# Patient Record
Sex: FEMALE | Race: WHITE | NOT HISPANIC OR LATINO | URBAN - METROPOLITAN AREA
[De-identification: names, ages, dates, MRNs, and addresses within clinical notes are randomized per-mention and may not be internally consistent; named-entity substitution may affect disease eponyms.]

---

## 2022-03-10 ENCOUNTER — PREPPED CHART (OUTPATIENT)
Dept: URBAN - METROPOLITAN AREA CLINIC 94 | Facility: CLINIC | Age: 77
End: 2022-03-10

## 2022-03-10 PROBLEM — H40.013 GLAUCOMA SUSPECT, LOW RISK: Noted: 2022-03-10

## 2022-03-10 PROBLEM — H35.363 MACULAR DRUSEN: Noted: 2022-01-12

## 2022-03-10 PROBLEM — H25.11 NUCLEAR SCLEROSIS: Noted: 2021-02-23

## 2022-03-10 PROBLEM — H25.13 NUCLEAR SCLEROSIS: Noted: 2021-02-23

## 2022-06-21 ENCOUNTER — FOLLOW UP (OUTPATIENT)
Dept: URBAN - METROPOLITAN AREA CLINIC 94 | Facility: CLINIC | Age: 77
End: 2022-06-21

## 2022-06-21 DIAGNOSIS — H40.013: ICD-10-CM

## 2022-06-21 PROCEDURE — 92012 INTRM OPH EXAM EST PATIENT: CPT

## 2022-06-21 ASSESSMENT — VISUAL ACUITY
OD_CC: 20/40-2
OS_CC: 20/70-1

## 2022-06-21 ASSESSMENT — TONOMETRY
OD_IOP_MMHG: 21
OS_IOP_MMHG: 19

## 2022-07-19 ENCOUNTER — COMPLETE EYE EXAM (OUTPATIENT)
Dept: URBAN - METROPOLITAN AREA CLINIC 94 | Facility: CLINIC | Age: 77
End: 2022-07-19

## 2022-07-19 DIAGNOSIS — H35.3132: ICD-10-CM

## 2022-07-19 PROCEDURE — 92202 OPSCPY EXTND ON/MAC DRAW: CPT

## 2022-07-19 PROCEDURE — 92014 COMPRE OPH EXAM EST PT 1/>: CPT

## 2022-07-19 PROCEDURE — 92134 CPTRZ OPH DX IMG PST SGM RTA: CPT

## 2022-07-19 ASSESSMENT — VISUAL ACUITY
OS_CC: 20/70-1
OD_CC: 20/40+2

## 2022-07-19 ASSESSMENT — TONOMETRY
OD_IOP_MMHG: 15
OS_IOP_MMHG: 13

## 2022-07-20 ENCOUNTER — FOLLOW UP (OUTPATIENT)
Dept: URBAN - METROPOLITAN AREA CLINIC 94 | Facility: CLINIC | Age: 77
End: 2022-07-20

## 2022-07-20 DIAGNOSIS — H40.013: ICD-10-CM

## 2022-07-20 PROCEDURE — 92012 INTRM OPH EXAM EST PATIENT: CPT

## 2022-07-20 ASSESSMENT — TONOMETRY
OD_IOP_MMHG: 11
OS_IOP_MMHG: 11

## 2022-07-20 ASSESSMENT — VISUAL ACUITY
OS_CC: 20/50-2
OD_CC: 20/40-2

## 2022-08-13 ENCOUNTER — HOSPITAL ENCOUNTER (OUTPATIENT)
Facility: CLINIC | Age: 77
Discharge: HOME | End: 2022-08-13
Attending: FAMILY MEDICINE
Payer: COMMERCIAL

## 2022-08-13 VITALS
HEART RATE: 73 BPM | RESPIRATION RATE: 16 BRPM | SYSTOLIC BLOOD PRESSURE: 153 MMHG | DIASTOLIC BLOOD PRESSURE: 81 MMHG | OXYGEN SATURATION: 99 % | TEMPERATURE: 98.2 F

## 2022-08-13 DIAGNOSIS — N39.0 URINARY TRACT INFECTION WITHOUT HEMATURIA, SITE UNSPECIFIED: Primary | ICD-10-CM

## 2022-08-13 LAB
BACTERIA, POC: ABNORMAL
BILIRUBIN, POC: NEGATIVE
BLOOD URINE, POC: NEGATIVE
CLARITY, POC: CLEAR
COLOR, POC: YELLOW
EXPIRATION DATE: ABNORMAL
GLUCOSE URINE, POC: NEGATIVE
KETONES, POC: NEGATIVE
LEUKOCYTE EST, POC: NEGATIVE
Lab: ABNORMAL
NITRITE, POC: ABNORMAL
PH, POC: 7
POCT MANUFACTURER: ABNORMAL
PROTEIN, POC: ABNORMAL
SPECIFIC GRAVITY, POC: 1.01
UROBILINOGEN, POC: 2

## 2022-08-13 PROCEDURE — 81002 URINALYSIS NONAUTO W/O SCOPE: CPT | Performed by: NURSE PRACTITIONER

## 2022-08-13 PROCEDURE — 99213 OFFICE O/P EST LOW 20 MIN: CPT | Performed by: NURSE PRACTITIONER

## 2022-08-13 PROCEDURE — 87086 URINE CULTURE/COLONY COUNT: CPT | Performed by: NURSE PRACTITIONER

## 2022-08-13 RX ORDER — NITROFURANTOIN 25; 75 MG/1; MG/1
100 CAPSULE ORAL 2 TIMES DAILY
Qty: 10 CAPSULE | Refills: 0 | Status: SHIPPED | OUTPATIENT
Start: 2022-08-13 | End: 2022-08-18

## 2022-08-13 ASSESSMENT — ENCOUNTER SYMPTOMS
ABDOMINAL PAIN: 0
FEVER: 0
ARTHRALGIAS: 0
EYE PAIN: 0
SHORTNESS OF BREATH: 0
PALPITATIONS: 0
CHILLS: 0
COLOR CHANGE: 0
VOMITING: 0
COUGH: 0
SORE THROAT: 0
DYSURIA: 1
SEIZURES: 0
HEMATURIA: 0
BACK PAIN: 0

## 2022-08-13 NOTE — ED ATTESTATION NOTE
I did not see the patient but was available for consultation     Ajith Watkins MD  08/13/22 [id number]

## 2022-08-13 NOTE — ED PROVIDER NOTES
Emergency Medicine Note  HPI   HISTORY OF PRESENT ILLNESS     Patient presents with lower abdominal pressure, dysuria and fatigue x1 week.  She reports a history of chronic UTIs.  She takes clear tract daily.  She reports that a home UTI test revealed positive leukocytes.  Denies any fever, hematuria and no new back pain.            Patient History   PAST HISTORY     Reviewed from Nursing Triage:         No past medical history on file.    No past surgical history on file.    No family history on file.           Review of Systems   REVIEW OF SYSTEMS     Review of Systems   Constitutional: Negative for chills and fever.   HENT: Negative for ear pain and sore throat.    Eyes: Negative for pain and visual disturbance.   Respiratory: Negative for cough and shortness of breath.    Cardiovascular: Negative for chest pain and palpitations.   Gastrointestinal: Negative for abdominal pain and vomiting.   Genitourinary: Positive for dysuria. Negative for hematuria.   Musculoskeletal: Negative for arthralgias and back pain.   Skin: Negative for color change and rash.   Neurological: Negative for seizures and syncope.   All other systems reviewed and are negative.        VITALS     ED Vitals    None                      Physical Exam   PHYSICAL EXAM     Physical Exam  Vitals reviewed.   Constitutional:       General: She is not in acute distress.     Appearance: Normal appearance. She is not ill-appearing, toxic-appearing or diaphoretic.   HENT:      Head: Normocephalic and atraumatic.   Cardiovascular:      Rate and Rhythm: Normal rate.   Pulmonary:      Effort: Pulmonary effort is normal. No respiratory distress.   Abdominal:      Tenderness: There is no right CVA tenderness or left CVA tenderness.   Musculoskeletal:         General: Normal range of motion.      Cervical back: Normal range of motion. No rigidity.   Skin:     General: Skin is warm and dry.   Neurological:      General: No focal deficit present.      Mental  Status: She is alert and oriented to person, place, and time.   Psychiatric:         Mood and Affect: Mood normal.         Behavior: Behavior normal.           PROCEDURES     Procedures     DATA     Results     None              No orders to display       Scoring tools                                  ED Course & MDM   MDM / ED COURSE / CLINICAL IMPRESSION / DISPO     MDM  Number of Diagnoses or Management Options  Urinary tract infection without hematuria, site unspecified  Diagnosis management comments: Urine culture with positive nitirites.  Sent prescription for Macrobid to requested pharmacy.   Sent urine culture, will call with results.  Recommended increase fluids.   Instructed to follow up with PCP or ED for new or worsening symptoms.            Clinical Impression      Urinary tract infection without hematuria, site unspecified     Disposition           Alma Marcos CRNP  08/13/22 1602       Alma Marcos CRNP  08/13/22 1620

## 2022-08-16 ENCOUNTER — TELEPHONE (OUTPATIENT)
Dept: URGENT CARE | Facility: CLINIC | Age: 77
End: 2022-08-16
Payer: COMMERCIAL

## 2022-08-16 LAB
BACTERIA UR CULT: ABNORMAL
BACTERIA UR CULT: ABNORMAL

## 2022-11-01 ENCOUNTER — FOLLOW UP (OUTPATIENT)
Dept: URBAN - METROPOLITAN AREA CLINIC 94 | Facility: CLINIC | Age: 77
End: 2022-11-01

## 2022-11-01 DIAGNOSIS — H40.013: ICD-10-CM

## 2022-11-01 PROCEDURE — 92012 INTRM OPH EXAM EST PATIENT: CPT

## 2022-11-01 ASSESSMENT — VISUAL ACUITY
OD_CC: 20/40
OS_CC: 20/60-2

## 2022-11-01 ASSESSMENT — TONOMETRY
OS_IOP_MMHG: 15
OD_IOP_MMHG: 14

## 2023-01-20 ENCOUNTER — COMPLETE EYE EXAM (OUTPATIENT)
Dept: URBAN - METROPOLITAN AREA CLINIC 94 | Facility: CLINIC | Age: 78
End: 2023-01-20

## 2023-01-20 DIAGNOSIS — H35.3132: ICD-10-CM

## 2023-01-20 DIAGNOSIS — H40.013: ICD-10-CM

## 2023-01-20 PROCEDURE — 92134 CPTRZ OPH DX IMG PST SGM RTA: CPT

## 2023-01-20 PROCEDURE — 92014 COMPRE OPH EXAM EST PT 1/>: CPT

## 2023-01-20 PROCEDURE — 92202 OPSCPY EXTND ON/MAC DRAW: CPT

## 2023-01-20 ASSESSMENT — VISUAL ACUITY
OS_CC: 20/70-1
OD_CC: 20/50-1

## 2023-01-20 ASSESSMENT — TONOMETRY
OS_IOP_MMHG: 14
OD_IOP_MMHG: 14

## 2023-03-21 ENCOUNTER — COMPLETE EYE EXAM (OUTPATIENT)
Dept: URBAN - METROPOLITAN AREA CLINIC 94 | Facility: CLINIC | Age: 78
End: 2023-03-21

## 2023-03-21 DIAGNOSIS — H40.013: ICD-10-CM

## 2023-03-21 DIAGNOSIS — H35.3132: ICD-10-CM

## 2023-03-21 PROCEDURE — 92014 COMPRE OPH EXAM EST PT 1/>: CPT

## 2023-03-21 ASSESSMENT — TONOMETRY
OD_IOP_MMHG: 14
OS_IOP_MMHG: 14

## 2023-03-21 ASSESSMENT — VISUAL ACUITY
OS_CC: 20/50
OD_CC: 20/40

## 2023-07-18 ENCOUNTER — FOLLOW UP (OUTPATIENT)
Dept: URBAN - METROPOLITAN AREA CLINIC 94 | Facility: CLINIC | Age: 78
End: 2023-07-18

## 2023-07-18 DIAGNOSIS — H40.013: ICD-10-CM

## 2023-07-18 DIAGNOSIS — H35.3132: ICD-10-CM

## 2023-07-18 PROCEDURE — 92012 INTRM OPH EXAM EST PATIENT: CPT

## 2023-07-18 PROCEDURE — 92083 EXTENDED VISUAL FIELD XM: CPT

## 2023-07-18 PROCEDURE — 92133 CPTRZD OPH DX IMG PST SGM ON: CPT

## 2023-07-18 ASSESSMENT — VISUAL ACUITY
OS_CC: 20/60
OD_CC: 20/50

## 2023-07-18 ASSESSMENT — TONOMETRY
OS_IOP_MMHG: 14
OD_IOP_MMHG: 13

## 2023-07-21 ENCOUNTER — ESTABLISHED COMPREHENSIVE EXAM (OUTPATIENT)
Dept: URBAN - METROPOLITAN AREA CLINIC 94 | Facility: CLINIC | Age: 78
End: 2023-07-21

## 2023-07-21 DIAGNOSIS — H40.013: ICD-10-CM

## 2023-07-21 DIAGNOSIS — H35.3132: ICD-10-CM

## 2023-07-21 DIAGNOSIS — H25.13: ICD-10-CM

## 2023-07-21 PROCEDURE — 92134 CPTRZ OPH DX IMG PST SGM RTA: CPT

## 2023-07-21 PROCEDURE — 92014 COMPRE OPH EXAM EST PT 1/>: CPT

## 2023-07-21 PROCEDURE — 92202 OPSCPY EXTND ON/MAC DRAW: CPT

## 2023-07-21 ASSESSMENT — VISUAL ACUITY
OD_CC: 20/50+2
OS_CC: 20/50+2

## 2023-07-21 ASSESSMENT — TONOMETRY
OD_IOP_MMHG: 14
OS_IOP_MMHG: 15

## 2023-08-29 ENCOUNTER — DIAGNOSTICS ONLY (OUTPATIENT)
Dept: URBAN - METROPOLITAN AREA CLINIC 94 | Facility: CLINIC | Age: 78
End: 2023-08-29

## 2023-08-29 DIAGNOSIS — H35.3132: ICD-10-CM

## 2023-08-29 PROCEDURE — 92235 FLUORESCEIN ANGRPH MLTIFRAME: CPT

## 2023-08-29 ASSESSMENT — VISUAL ACUITY
OS_CC: 20/50
OD_CC: 20/40

## 2023-11-14 ENCOUNTER — IOP CHECK (OUTPATIENT)
Dept: URBAN - METROPOLITAN AREA CLINIC 94 | Facility: CLINIC | Age: 78
End: 2023-11-14

## 2023-11-14 DIAGNOSIS — H35.3132: ICD-10-CM

## 2023-11-14 DIAGNOSIS — H40.013: ICD-10-CM

## 2023-11-14 PROCEDURE — 92012 INTRM OPH EXAM EST PATIENT: CPT

## 2023-11-14 ASSESSMENT — TONOMETRY
OD_IOP_MMHG: 16
OS_IOP_MMHG: 17

## 2023-11-14 ASSESSMENT — VISUAL ACUITY
OS_CC: 20/50
OD_CC: 20/40-1

## 2024-03-14 ENCOUNTER — ESTABLISHED COMPREHENSIVE EXAM (OUTPATIENT)
Dept: URBAN - METROPOLITAN AREA CLINIC 94 | Facility: CLINIC | Age: 79
End: 2024-03-14

## 2024-03-14 DIAGNOSIS — H40.013: ICD-10-CM

## 2024-03-14 DIAGNOSIS — H25.13: ICD-10-CM

## 2024-03-14 DIAGNOSIS — H35.3132: ICD-10-CM

## 2024-03-14 PROCEDURE — 92014 COMPRE OPH EXAM EST PT 1/>: CPT

## 2024-03-14 ASSESSMENT — VISUAL ACUITY
OS_CC: 20/60-1
OD_CC: 20/50-1

## 2024-03-14 ASSESSMENT — TONOMETRY
OS_IOP_MMHG: 16
OD_IOP_MMHG: 16

## 2024-04-05 ENCOUNTER — ESTABLISHED COMPREHENSIVE EXAM (OUTPATIENT)
Dept: URBAN - METROPOLITAN AREA CLINIC 82 | Facility: CLINIC | Age: 79
End: 2024-04-05

## 2024-04-05 DIAGNOSIS — H35.3132: ICD-10-CM

## 2024-04-05 DIAGNOSIS — H25.13: ICD-10-CM

## 2024-04-05 DIAGNOSIS — H40.013: ICD-10-CM

## 2024-04-05 PROCEDURE — 92014 COMPRE OPH EXAM EST PT 1/>: CPT

## 2024-04-05 PROCEDURE — 92202 OPSCPY EXTND ON/MAC DRAW: CPT

## 2024-04-05 PROCEDURE — 92134 CPTRZ OPH DX IMG PST SGM RTA: CPT

## 2024-04-05 ASSESSMENT — TONOMETRY
OS_IOP_MMHG: 14
OD_IOP_MMHG: 14

## 2024-04-05 ASSESSMENT — VISUAL ACUITY
OS_CC: 20/80-2
OD_CC: 20/60-2

## 2024-07-25 ENCOUNTER — DIAGNOSTICS ONLY (OUTPATIENT)
Dept: URBAN - METROPOLITAN AREA CLINIC 90 | Facility: CLINIC | Age: 79
End: 2024-07-25

## 2024-07-25 DIAGNOSIS — H40.013: ICD-10-CM

## 2024-07-25 PROCEDURE — 92133 CPTRZD OPH DX IMG PST SGM ON: CPT

## 2024-07-25 PROCEDURE — 92083 EXTENDED VISUAL FIELD XM: CPT

## 2024-08-16 ENCOUNTER — FOLLOW UP (OUTPATIENT)
Dept: URBAN - METROPOLITAN AREA CLINIC 82 | Facility: CLINIC | Age: 79
End: 2024-08-16

## 2024-08-16 DIAGNOSIS — H40.013: ICD-10-CM

## 2024-08-16 DIAGNOSIS — H35.3132: ICD-10-CM

## 2024-08-16 DIAGNOSIS — H25.13: ICD-10-CM

## 2024-08-16 PROCEDURE — 99214 OFFICE O/P EST MOD 30 MIN: CPT

## 2024-08-16 ASSESSMENT — TONOMETRY
OD_IOP_MMHG: 14
OS_IOP_MMHG: 14

## 2024-08-16 ASSESSMENT — VISUAL ACUITY
OD_CC: 20/50
OS_CC: 20/100

## 2024-09-16 ENCOUNTER — CATARACT PRE-OP EXAM (OUTPATIENT)
Dept: URBAN - METROPOLITAN AREA CLINIC 82 | Facility: CLINIC | Age: 79
End: 2024-09-16

## 2024-09-16 DIAGNOSIS — H40.013: ICD-10-CM

## 2024-09-16 DIAGNOSIS — H25.13: ICD-10-CM

## 2024-09-16 DIAGNOSIS — H35.3132: ICD-10-CM

## 2024-09-16 PROCEDURE — 92136 OPHTHALMIC BIOMETRY: CPT

## 2024-09-16 PROCEDURE — 99213 OFFICE O/P EST LOW 20 MIN: CPT

## 2024-09-16 PROCEDURE — 92025 CPTRIZED CORNEAL TOPOGRAPHY: CPT

## 2024-09-16 PROCEDURE — 92134 CPTRZ OPH DX IMG PST SGM RTA: CPT

## 2024-09-16 ASSESSMENT — VISUAL ACUITY
OD_CC: 20/50
OS_CC: 20/100
OD_GLARE: 20/200
OS_GLARE: 20/200

## 2024-09-16 ASSESSMENT — TONOMETRY
OS_IOP_MMHG: 17
OD_IOP_MMHG: 17

## 2024-10-04 ENCOUNTER — ESTABLISHED COMPREHENSIVE EXAM (OUTPATIENT)
Dept: URBAN - METROPOLITAN AREA CLINIC 70 | Facility: CLINIC | Age: 79
End: 2024-10-04

## 2024-10-04 DIAGNOSIS — H40.013: ICD-10-CM

## 2024-10-04 DIAGNOSIS — H25.13: ICD-10-CM

## 2024-10-04 DIAGNOSIS — H35.3132: ICD-10-CM

## 2024-10-04 PROCEDURE — 99214 OFFICE O/P EST MOD 30 MIN: CPT

## 2024-10-04 PROCEDURE — 92134 CPTRZ OPH DX IMG PST SGM RTA: CPT

## 2024-10-04 PROCEDURE — 92202 OPSCPY EXTND ON/MAC DRAW: CPT

## 2024-10-04 ASSESSMENT — TONOMETRY
OS_IOP_MMHG: 9
OD_IOP_MMHG: 10

## 2024-10-04 ASSESSMENT — VISUAL ACUITY
OS_CC: 20/200
OD_CC: 20/70-2

## 2025-01-15 ENCOUNTER — 1 DAY POST-OP (OUTPATIENT)
Dept: URBAN - METROPOLITAN AREA CLINIC 82 | Facility: CLINIC | Age: 80
End: 2025-01-15

## 2025-01-15 DIAGNOSIS — H25.11: ICD-10-CM

## 2025-01-15 DIAGNOSIS — Z96.1: ICD-10-CM

## 2025-01-15 DIAGNOSIS — H35.3132: ICD-10-CM

## 2025-01-15 DIAGNOSIS — H40.013: ICD-10-CM

## 2025-01-15 PROCEDURE — 99024 POSTOP FOLLOW-UP VISIT: CPT

## 2025-01-15 ASSESSMENT — VISUAL ACUITY
OS_PH: 20/100
OS_SC: 20/200-1
OD_CC: 20/200

## 2025-01-15 ASSESSMENT — TONOMETRY
OD_IOP_MMHG: 15
OS_IOP_MMHG: 19

## 2025-01-22 ENCOUNTER — 1 WEEK POST-OP (OUTPATIENT)
Dept: URBAN - METROPOLITAN AREA CLINIC 70 | Facility: CLINIC | Age: 80
End: 2025-01-22

## 2025-01-22 DIAGNOSIS — Z96.1: ICD-10-CM

## 2025-01-22 DIAGNOSIS — H35.3132: ICD-10-CM

## 2025-01-22 PROCEDURE — 99024 POSTOP FOLLOW-UP VISIT: CPT

## 2025-01-22 ASSESSMENT — VISUAL ACUITY
OD_PH: 20/30
OS_PH: 20/70
OS_CC: 20/100
OD_CC: 20/50-1

## 2025-01-22 ASSESSMENT — TONOMETRY
OS_IOP_MMHG: 19
OD_IOP_MMHG: 18

## 2025-01-29 ENCOUNTER — 1 DAY POST-OP (OUTPATIENT)
Dept: URBAN - METROPOLITAN AREA CLINIC 82 | Facility: CLINIC | Age: 80
End: 2025-01-29

## 2025-01-29 DIAGNOSIS — H35.3132: ICD-10-CM

## 2025-01-29 DIAGNOSIS — H40.013: ICD-10-CM

## 2025-01-29 DIAGNOSIS — Z96.1: ICD-10-CM

## 2025-01-29 PROCEDURE — 99024 POSTOP FOLLOW-UP VISIT: CPT

## 2025-01-29 ASSESSMENT — VISUAL ACUITY
OD_SC: 20/70
OD_PH: 20/40
OS_SC: 20/100
OS_PH: 20/80+2

## 2025-01-29 ASSESSMENT — TONOMETRY
OS_IOP_MMHG: 11
OD_IOP_MMHG: 19

## 2025-02-06 ENCOUNTER — 1 DAY POST-OP (OUTPATIENT)
Dept: URBAN - METROPOLITAN AREA CLINIC 82 | Facility: CLINIC | Age: 80
End: 2025-02-06

## 2025-02-06 DIAGNOSIS — Z96.1: ICD-10-CM

## 2025-02-06 PROCEDURE — 99024 POSTOP FOLLOW-UP VISIT: CPT

## 2025-02-06 ASSESSMENT — VISUAL ACUITY
OD_PH: 20/30-2
OS_SC: 20/100-1
OU_SC: J2
OD_SC: 20/50-1

## 2025-02-06 ASSESSMENT — TONOMETRY
OD_IOP_MMHG: 16
OS_IOP_MMHG: 9

## 2025-03-06 ENCOUNTER — 1 MONTH POST-OP (OUTPATIENT)
Dept: URBAN - METROPOLITAN AREA CLINIC 82 | Facility: CLINIC | Age: 80
End: 2025-03-06

## 2025-03-06 DIAGNOSIS — H35.3132: ICD-10-CM

## 2025-03-06 DIAGNOSIS — Z96.1: ICD-10-CM

## 2025-03-06 DIAGNOSIS — H52.03: ICD-10-CM

## 2025-03-06 DIAGNOSIS — H40.013: ICD-10-CM

## 2025-03-06 PROCEDURE — 99024 POSTOP FOLLOW-UP VISIT: CPT

## 2025-03-06 PROCEDURE — 92015 DETERMINE REFRACTIVE STATE: CPT

## 2025-03-06 ASSESSMENT — VISUAL ACUITY
OS_SC: 20/200
OD_PH: 20/30-2
OD_SC: 20/50
OS_PH: 20/70

## 2025-03-06 ASSESSMENT — TONOMETRY
OS_IOP_MMHG: 10
OD_IOP_MMHG: 10

## 2025-08-20 ENCOUNTER — FOLLOW UP (OUTPATIENT)
Dept: URBAN - METROPOLITAN AREA CLINIC 94 | Facility: CLINIC | Age: 80
End: 2025-08-20

## 2025-08-20 DIAGNOSIS — Z96.1: ICD-10-CM

## 2025-08-20 DIAGNOSIS — H35.3132: ICD-10-CM

## 2025-08-20 DIAGNOSIS — H40.1132: ICD-10-CM

## 2025-08-20 DIAGNOSIS — H40.013: ICD-10-CM

## 2025-08-20 PROCEDURE — 99213 OFFICE O/P EST LOW 20 MIN: CPT

## 2025-08-20 ASSESSMENT — TONOMETRY
OS_IOP_MMHG: 12
OD_IOP_MMHG: 11

## 2025-08-20 ASSESSMENT — VISUAL ACUITY
OD_CC: 20/30-2
OS_CC: 20/70-1